# Patient Record
Sex: FEMALE
[De-identification: names, ages, dates, MRNs, and addresses within clinical notes are randomized per-mention and may not be internally consistent; named-entity substitution may affect disease eponyms.]

---

## 2018-01-21 ENCOUNTER — HOSPITAL ENCOUNTER (EMERGENCY)
Dept: HOSPITAL 31 - C.ER | Age: 53
Discharge: LEFT BEFORE DISCHARGE | End: 2018-01-21
Payer: COMMERCIAL

## 2018-01-21 VITALS
SYSTOLIC BLOOD PRESSURE: 133 MMHG | HEART RATE: 105 BPM | TEMPERATURE: 98.2 F | RESPIRATION RATE: 20 BRPM | DIASTOLIC BLOOD PRESSURE: 81 MMHG | OXYGEN SATURATION: 94 %

## 2018-01-21 DIAGNOSIS — Z02.89: Primary | ICD-10-CM

## 2018-01-21 DIAGNOSIS — R10.30: ICD-10-CM

## 2018-01-26 NOTE — C.PDOC
Time Seen by Provider: 01/21/18 18:06


Chief Complaint (Nursing): Abdominal Pain





Past Medical History


Vital Signs: 





 Last Vital Signs











Temp  98.2 F   01/21/18 17:52


 


Pulse  105 H  01/21/18 17:52


 


Resp  20   01/21/18 17:52


 


BP  133/81   01/21/18 17:52


 


Pulse Ox  94 L  01/21/18 17:52














- Medical History


PMH: Anxiety, Bipolar Disorder, Depression, HTN





- Social History


Hx Alcohol Use: No


Hx Substance Use: No





- Immunization History


Hx Tetanus Toxoid Vaccination: No


Hx Influenza Vaccination: No


Hx Pneumococcal Vaccination: No





ED Course And Treatment


O2 Sat by Pulse Oximetry: 94





Disposition





- Disposition


Disposition: LEFT W/O BEING SEEN - ER ONLY